# Patient Record
Sex: MALE | Race: BLACK OR AFRICAN AMERICAN | NOT HISPANIC OR LATINO | Employment: FULL TIME | URBAN - METROPOLITAN AREA
[De-identification: names, ages, dates, MRNs, and addresses within clinical notes are randomized per-mention and may not be internally consistent; named-entity substitution may affect disease eponyms.]

---

## 2020-04-06 ENCOUNTER — TELEMEDICINE (OUTPATIENT)
Dept: INTERNAL MEDICINE | Facility: CLINIC | Age: 35
End: 2020-04-06

## 2020-04-06 DIAGNOSIS — G43.909 MIGRAINE WITHOUT STATUS MIGRAINOSUS, NOT INTRACTABLE, UNSPECIFIED MIGRAINE TYPE: Primary | ICD-10-CM

## 2020-04-06 DIAGNOSIS — L20.9 ATOPIC DERMATITIS, UNSPECIFIED TYPE: ICD-10-CM

## 2020-04-06 PROCEDURE — 99204 OFFICE O/P NEW MOD 45 MIN: CPT | Performed by: NURSE PRACTITIONER

## 2020-04-06 RX ORDER — PREDNISONE 1 MG/1
TABLET ORAL
Qty: 21 TABLET | Refills: 1 | Status: SHIPPED | OUTPATIENT
Start: 2020-04-06 | End: 2023-03-28

## 2020-04-06 RX ORDER — SUMATRIPTAN 20 MG/1
1 SPRAY NASAL
Qty: 1 EACH | Refills: 5 | Status: SHIPPED | OUTPATIENT
Start: 2020-04-06 | End: 2023-03-28

## 2020-04-06 NOTE — PROGRESS NOTES
Subjective   Chief Complaint   Patient presents with   • Migraine     This is video visit.     Milton Buenrostro is a 34 y.o. male here today to establish care for headaches and rash  He has long history of migraine-like headaches that were diagnosed as cluster headaches.  Headache develops on the right side of his head and he has pain behind his right eye.  Headaches have been occurring several time weekly and sometimes every day. He has photophobia and nausea. No vomiting. He was previously treated with imitrex nasal spray and prednisone PRN. He's only had to take prednisone a couple of times in the past and this stopped the cycle of headaches. Does not feel his headaches are caused by hypertension. Needs blood work in the future. No labs in years. Has rash that develops seasonally in the spring with dry patches and small little itchy bumps. Rash occurs on all extremities. Has area on right lower extremity now.    Review of Systems   Constitutional: Negative for activity change, appetite change, chills, diaphoresis, fatigue, fever, unexpected weight gain and unexpected weight loss.   HENT: Negative for ear discharge, ear pain, mouth sores, nosebleeds, sinus pressure, sneezing and sore throat.    Eyes: Positive for photophobia and pain. Negative for discharge and itching.   Respiratory: Negative for cough, chest tightness, shortness of breath and wheezing.    Cardiovascular: Negative for chest pain, palpitations and leg swelling.   Gastrointestinal: Negative for abdominal pain, constipation, diarrhea, nausea and vomiting.   Endocrine: Negative for heat intolerance, polydipsia and polyphagia.   Genitourinary: Negative for dysuria, flank pain, frequency, hematuria and urgency.   Musculoskeletal: Negative for arthralgias, back pain, gait problem, joint swelling, myalgias, neck pain and neck stiffness.   Skin: Positive for dry skin and rash. Negative for color change and pallor.   Allergic/Immunologic: Negative for  immunocompromised state.   Neurological: Positive for headache. Negative for seizures, speech difficulty, weakness and numbness.   Hematological: Negative for adenopathy.   Psychiatric/Behavioral: Negative for agitation, decreased concentration, sleep disturbance, suicidal ideas and depressed mood. The patient is not nervous/anxious.        History reviewed. No pertinent past medical history.  No past surgical history on file.  History reviewed. No pertinent family history.  Social History     Tobacco Use   Smoking Status Not on file      Social History     Substance and Sexual Activity   Alcohol Use Not on file      No current outpatient medications on file prior to visit.     No current facility-administered medications on file prior to visit.      No Known Allergies    Objective   There were no vitals filed for this visit.  There is no height or weight on file to calculate BMI.    Physical Exam   Constitutional: He is oriented to person, place, and time. He appears well-developed and well-nourished.   HENT:   Head: Normocephalic and atraumatic.   Nose: Nose normal.   Eyes: Conjunctivae and lids are normal.   Neck: Trachea normal.   Pulmonary/Chest: No respiratory distress.   Neurological: He is alert and oriented to person, place, and time. GCS eye subscore is 4. GCS verbal subscore is 5. GCS motor subscore is 6.   Skin: Rash noted.   Right lower extremity - dry patch with small bumps (visualized on video).   Psychiatric: He has a normal mood and affect. His speech is normal and behavior is normal. Thought content normal. Cognition and memory are normal.       Assessment/Plan   Milton was seen today for migraine.    Diagnoses and all orders for this visit:    Migraine without status migrainosus, not intractable, unspecified migraine type  Chronic condition unstable requiring medication management and monitoring.  Will eat well balanced low sodium diet, increase water intake including at least two electrolyte  balanced drinks such as Propel or Gatorade daily, increase physical activity, and get adequate rest. Will keep log of migraine frequency, severity, and symptoms for next appt.   Discuss with him in future if he's had MRI? Requested that he intermittently attempt to monitor blood pressure to rule out this as cause for headaches.   -     SUMAtriptan (Imitrex) 20 MG/ACT nasal spray; 1 spray into the nostril(s) as directed by provider Every 2 (Two) Hours As Needed for Migraine.  -     predniSONE (DELTASONE) 5 MG tablet; 6 day taper pack - take as directed.    Atopic dermatitis, unspecified type  Chronic issue unstable requiring medication management.  Will eat a well-balanced diet, increase water intake, and get adequate rest.  Discussed skin hygiene and importance of moisture. Suggested daily antihistamine.   -     predniSONE (DELTASONE) 5 MG tablet; 6 day taper pack - take as directed.  -     triamcinolone (KENALOG) 0.1 % ointment; Apply  topically to the appropriate area as directed 2 (Two) Times a Day.           Current Outpatient Medications:   •  predniSONE (DELTASONE) 5 MG tablet, 6 day taper pack - take as directed., Disp: 21 tablet, Rfl: 1  •  SUMAtriptan (Imitrex) 20 MG/ACT nasal spray, 1 spray into the nostril(s) as directed by provider Every 2 (Two) Hours As Needed for Migraine., Disp: 1 each, Rfl: 5  •  triamcinolone (KENALOG) 0.1 % ointment, Apply  topically to the appropriate area as directed 2 (Two) Times a Day., Disp: 80 g, Rfl: 2       Plan of care reviewed with the patient at the conclusion of today's visit.  Education was provided regarding diagnosis, management, and any prescribed or recommended OTC medications.  Patient verbalized understanding of and agreement with management plan.     Return if symptoms worsen or fail to improve.      Daisy Ramon, APRN    Please note that portions of this note were completed with a voice recognition program. Efforts were made to edit the dictations,  but occasionally words are mistranscribed.

## 2023-03-29 ENCOUNTER — OFFICE VISIT (OUTPATIENT)
Dept: SURGERY | Facility: CLINIC | Age: 38
End: 2023-03-29
Payer: COMMERCIAL

## 2023-03-29 VITALS
DIASTOLIC BLOOD PRESSURE: 90 MMHG | TEMPERATURE: 98.2 F | WEIGHT: 225 LBS | BODY MASS INDEX: 28.88 KG/M2 | HEART RATE: 132 BPM | OXYGEN SATURATION: 100 % | SYSTOLIC BLOOD PRESSURE: 146 MMHG | HEIGHT: 74 IN

## 2023-03-29 DIAGNOSIS — L72.3 SEBACEOUS CYST: Primary | ICD-10-CM

## 2023-03-29 DIAGNOSIS — L02.411 CUTANEOUS ABSCESS OF RIGHT AXILLA: ICD-10-CM

## 2023-03-29 PROCEDURE — 10061 I&D ABSCESS COMP/MULTIPLE: CPT | Performed by: SURGERY

## 2023-03-29 PROCEDURE — 99204 OFFICE O/P NEW MOD 45 MIN: CPT | Performed by: SURGERY

## 2023-03-29 NOTE — PROGRESS NOTES
Patient: Milton Buenrostro    YOB: 1985    Date: 03/29/2023    Primary Care Provider: Daisy Moreno APRN    Chief Complaint   Patient presents with   • Mass     Axillary        SUBJECTIVE:    History of present illness:  Patient is here for evaluation of a mass in his left axilla.  Patient was seen at Urgent Care 03/28/2023, he was given Rx Bactrim DS which he has been taking as directed. The patient states the mass has been present for the past 20 years. He states the mass got inflamed a couple days ago.     This does cause him sharp discomfort, associated with erythema, located in the right axilla, present over the past several days, worse with pressure, not relieved.    The following portions of the patient's history were reviewed and updated as appropriate: allergies, current medications, past family history, past medical history, past social history, past surgical history and problem list.      Review of Systems   Constitutional: Negative for chills, fever and unexpected weight change.   HENT: Negative for trouble swallowing and voice change.    Eyes: Negative for visual disturbance.   Respiratory: Negative for apnea, cough, chest tightness, shortness of breath and wheezing.    Cardiovascular: Negative for chest pain, palpitations and leg swelling.   Gastrointestinal: Negative for abdominal distention, abdominal pain, anal bleeding, blood in stool, constipation, diarrhea, nausea, rectal pain and vomiting.   Endocrine: Negative for cold intolerance and heat intolerance.   Genitourinary: Negative for difficulty urinating, dysuria, flank pain, scrotal swelling and testicular pain.   Musculoskeletal: Negative for back pain, gait problem and joint swelling.   Skin: Negative for color change, rash and wound.   Neurological: Negative for dizziness, syncope, speech difficulty, weakness, numbness and headaches.   Hematological: Negative for adenopathy. Does not bruise/bleed easily.  "  Psychiatric/Behavioral: Negative for confusion. The patient is not nervous/anxious.        Allergies:  Allergies   Allergen Reactions   • Bismuth Subsalicylate Rash       Medications:    Current Outpatient Medications:   •  sulfamethoxazole-trimethoprim (BACTRIM DS,SEPTRA DS) 800-160 MG per tablet, Take 1 tablet by mouth 2 (Two) Times a Day for 10 days., Disp: 20 tablet, Rfl: 0    History:  Past Medical History:   Diagnosis Date   • Migraine-cluster headache syndrome        History reviewed. No pertinent surgical history.    History reviewed. No pertinent family history.    Social History     Tobacco Use   • Smoking status: Never   • Smokeless tobacco: Never   Vaping Use   • Vaping Use: Never used   Substance Use Topics   • Alcohol use: Defer   • Drug use: Defer        OBJECTIVE:    Vital Signs:   Vitals:    03/29/23 1514   BP: 146/90   Pulse: (!) 132   Temp: 98.2 °F (36.8 °C)   SpO2: 100%   Weight: 102 kg (225 lb)   Height: 188 cm (74\")       Physical Exam:   General Appearance:    Alert, cooperative, in no acute distress   Head:    Normocephalic, without obvious abnormality, atraumatic   Eyes:            Lids and lashes normal, conjunctivae and sclerae normal, no   icterus, no pallor, corneas clear, PERRLA   Ears:    Ears appear intact with no abnormalities noted   Throat:   No oral lesions, no thrush, oral mucosa moist   Neck:   No adenopathy, supple, trachea midline, no thyromegaly, no   carotid bruit, no JVD   Lungs:     Clear to auscultation,respirations regular, even and                  unlabored    Heart:    Regular rhythm and normal rate, normal S1 and S2, no            murmur, no gallop, no rub, no click   Chest Wall:    No abnormalities observed   Abdomen:     Normal bowel sounds, no masses, no organomegaly, soft        non-tender, non-distended, no guarding, no rebound                tenderness   Extremities:   Moves all extremities well, no edema, no cyanosis, no             redness   Pulses:   " Pulses palpable and equal bilaterally   Skin:   No bleeding, bruising or rash, abscess of the right axilla   Lymph nodes:   No palpable adenopathy   Neurologic:   Cranial nerves 2 - 12 grossly intact, sensation intact, DTR       present and equal bilaterally     Results Review:   I reviewed the patient's new clinical results.  I reviewed the patient's new imaging results and agree with the interpretation.  I reviewed the patient's other test results and agree with the interpretation    Review of Systems was reviewed and confirmed as accurate as documented by the MA.    ASSESSMENT/PLAN:    1. Sebaceous cyst    2. Cutaneous abscess of right axilla        Procedure:     I recommended abscess drainage to the patient. I explained the indication as well as the risks and benefits which include bleeding, further infection requiring additional procedures, non healing of the wound etc. The patient understands these and wishes to proceed.      The patient was brought to the procedure room. Consent and time out were performed. The area was prepped and draped in the usual fashion. 1% lidocaine with epinephrine was infused locally. The abscess was then incised and drained sharply with a #11 blade. Purulent contents were evacuated and irrigated with saline and peroxide. Minimal blood loss had occurred and was well controlled with pressure. There were no complications and the patient tolerated the procedure well. Wound instructions were given.    Clinically this was an infected sebaceous cyst and a large amount of sebum and cyst cavity was removed.  I have asked the patient to stay on his current antibiotics.  This was complicated in nature.    I discussed the patients findings and my recommendations with patient        Electronically signed by Garrick Bustamante MD  03/29/23

## 2025-01-24 ENCOUNTER — PATIENT ROUNDING (BHMG ONLY) (OUTPATIENT)
Dept: URGENT CARE | Facility: CLINIC | Age: 40
End: 2025-01-24
Payer: COMMERCIAL

## 2025-02-03 ENCOUNTER — OFFICE VISIT (OUTPATIENT)
Age: 40
End: 2025-02-03
Payer: COMMERCIAL

## 2025-02-03 ENCOUNTER — LAB (OUTPATIENT)
Age: 40
End: 2025-02-03
Payer: COMMERCIAL

## 2025-02-03 VITALS
BODY MASS INDEX: 30.39 KG/M2 | HEIGHT: 74 IN | SYSTOLIC BLOOD PRESSURE: 154 MMHG | HEART RATE: 70 BPM | OXYGEN SATURATION: 99 % | WEIGHT: 236.8 LBS | DIASTOLIC BLOOD PRESSURE: 94 MMHG

## 2025-02-03 DIAGNOSIS — Z12.5 SCREENING FOR PROSTATE CANCER: ICD-10-CM

## 2025-02-03 DIAGNOSIS — I10 PRIMARY HYPERTENSION: ICD-10-CM

## 2025-02-03 DIAGNOSIS — E66.9 OBESITY (BMI 30-39.9): ICD-10-CM

## 2025-02-03 DIAGNOSIS — I10 PRIMARY HYPERTENSION: Primary | ICD-10-CM

## 2025-02-03 LAB — HBA1C MFR BLD: 5.8 % (ref 4.8–5.6)

## 2025-02-03 PROCEDURE — 99214 OFFICE O/P EST MOD 30 MIN: CPT

## 2025-02-03 PROCEDURE — G0103 PSA SCREENING: HCPCS

## 2025-02-03 PROCEDURE — 80053 COMPREHEN METABOLIC PANEL: CPT

## 2025-02-03 PROCEDURE — 93000 ELECTROCARDIOGRAM COMPLETE: CPT

## 2025-02-03 PROCEDURE — 83036 HEMOGLOBIN GLYCOSYLATED A1C: CPT

## 2025-02-03 PROCEDURE — 84443 ASSAY THYROID STIM HORMONE: CPT

## 2025-02-03 PROCEDURE — 80061 LIPID PANEL: CPT

## 2025-02-03 PROCEDURE — 82306 VITAMIN D 25 HYDROXY: CPT

## 2025-02-03 RX ORDER — LOSARTAN POTASSIUM 25 MG/1
25 TABLET ORAL DAILY
Qty: 30 TABLET | Refills: 3 | Status: SHIPPED | OUTPATIENT
Start: 2025-02-03

## 2025-02-03 NOTE — PROGRESS NOTES
New Patient Office Visit      Date: 2025   Patient Name: Milton Buenrostro  : 1985   MRN: [id number]     Chief Complaint:    Chief Complaint   Patient presents with    New Patient    Headache       History of Present Illness: Milton Buenrostro is a 39 y.o. male who is here today to establish care.    History of Present Illness  The patient is a 39-year-old male who presents for evaluation of cluster headaches, elevated blood pressure, and erectile dysfunction.    He has been experiencing cluster headaches since the age of 28, which he believes are seasonally triggered, typically occurring at the end of January or early February. These episodes can last up to 3 weeks. He experienced his first headache of the season yesterday. The pain is described as a burning sensation behind one eye, accompanied by nasal drainage and redness in the affected eye. Without medication, these symptoms can persist for 3 to 4 hours, but with treatment, they usually subside within 30 minutes. However, if the pain intensifies before medication is taken, the treatment becomes ineffective. Oxygen therapy administered in the emergency room has proven beneficial, alleviating symptoms within 20 minutes. He has identified certain triggers for his headaches, including alcohol consumption, napping, sexual activity, excessive heat, hyperactivity, and smoking. He has not identified any other triggers apart from weather changes. He has not consulted a neurologist for his condition. Initial treatments with prednisone and sumatriptan were ineffective, but a 2-week tapering course of prednisone has been successful in managing his symptoms. He occasionally requires a second prescription, which he does not always complete. He has been using this treatment regimen for the past 5 to 6 years. He ran out of his steroid medication yesterday.    He has been monitoring his blood pressure at home and has been making lifestyle modifications, including  regular gym workouts, to manage his hypertension. He does not own a blood pressure cuff. He has expressed concerns about potential side effects of antihypertensive medications, particularly drowsiness, given his occupation as a .    He has reported some issues with erectile function, although he is still able to engage in sexual activity. He has expressed interest in having his testosterone levels checked.    Supplemental Information  He had a ringworm infection, which was successfully treated with a prescribed cream.    SOCIAL HISTORY  He does not smoke. He drinks about 2 drinks a week. He reports no history of drug use.    FAMILY HISTORY  His mother and maternal grandmother had diabetes. His father has prostate cancer. He does not recall the medical history of his paternal grandparents. There is no family history of migraines or headaches.    ALLERGIES  He had a rash from BISMUTH SUBSALICYLATE (PEPTO-BISMOL) in the past.    MEDICATIONS  Current: Sumatriptan, prednisone  Past: Fioricet      Subjective      Review of Systems:   Review of Systems    Past Medical History:   Past Medical History:   Diagnosis Date    Migraine-cluster headache syndrome        Past Surgical History: History reviewed. No pertinent surgical history.    Family History:   Family History   Problem Relation Age of Onset    Diabetes Mother     Prostate cancer Father     Diabetes Maternal Grandmother     Colon cancer Neg Hx        Social History:   Social History     Socioeconomic History    Marital status: Single   Tobacco Use    Smoking status: Never    Smokeless tobacco: Never   Vaping Use    Vaping status: Never Used   Substance and Sexual Activity    Alcohol use: Not Currently     Alcohol/week: 2.0 standard drinks of alcohol     Types: 1 Glasses of wine, 1 Cans of beer per week    Drug use: Never    Sexual activity: Yes     Partners: Female       Medications:     Current Outpatient Medications:     SUMAtriptan (IMITREX) 20 MG/ACT nasal  "spray, Administer 1 spray into the nostril(s) as directed by provider Every 2 (Two) Hours As Needed for Migraine (max 1 dose (20mg) per 24 hours.)., Disp: 2 each, Rfl: 0    losartan (Cozaar) 25 MG tablet, Take 1 tablet by mouth Daily., Disp: 30 tablet, Rfl: 3    Allergies:   Allergies   Allergen Reactions    Bismuth Subsalicylate Rash       Immunizations:  Immunization History   Administered Date(s) Administered    DTP [birth year], 01/14/1985, 02/13/1986    Hep B, Adolescent or Pediatric 02/19/1998, 03/18/1998    MMR 09/26/1985, 08/15/1995    OPV 1985, 1985, 02/13/1986       Objective     Physical Exam:  Vital Signs:   Vitals:    02/03/25 1515   BP: 154/94   BP Location: Left arm   Patient Position: Sitting   Cuff Size: Large Adult   Pulse: 70   SpO2: 99%   Weight: 107 kg (236 lb 12.8 oz)   Height: 188 cm (74.02\")   PainSc: 0-No pain     Body mass index is 30.39 kg/m².    Physical Exam  Constitutional:       Appearance: Normal appearance.   HENT:      Head: Normocephalic and atraumatic.      Nose: No congestion or rhinorrhea.      Mouth/Throat:      Pharynx: No oropharyngeal exudate or posterior oropharyngeal erythema.   Eyes:      General:         Right eye: No discharge.         Left eye: No discharge.      Extraocular Movements: Extraocular movements intact.      Pupils: Pupils are equal, round, and reactive to light.   Cardiovascular:      Rate and Rhythm: Normal rate and regular rhythm.      Heart sounds: No murmur heard.     No friction rub. No gallop.   Pulmonary:      Effort: Pulmonary effort is normal.      Breath sounds: Normal breath sounds.   Abdominal:      General: Abdomen is flat. Bowel sounds are normal. There is no distension.      Palpations: Abdomen is soft.      Tenderness: There is no abdominal tenderness. There is no guarding or rebound.   Musculoskeletal:         General: Normal range of motion.      Cervical back: Normal range of motion.      Right lower leg: No edema. " "     Left lower leg: No edema.   Skin:     General: Skin is warm.      Capillary Refill: Capillary refill takes less than 2 seconds.      Findings: No rash.   Neurological:      General: No focal deficit present.      Mental Status: He is alert.   Psychiatric:         Mood and Affect: Mood normal.           ECG 12 Lead    Date/Time: 2/3/2025 4:05 PM  Performed by: Ivette Shah MD    Authorized by: Ivette Shah MD  Comparison: not compared with previous ECG   Rhythm: sinus rhythm  Rate: normal  Conduction: conduction normal  ST Segments: ST segments normal  T Waves: T waves normal  QRS axis: normal  Other: no other findings    Clinical impression: normal ECG          Results:     Labs:   No results found for: \"HGBA1C\", \"CMP\", \"CBCDIFFPANEL\", \"CREAT\", \"TSH\"     Imaging:   No valid procedures specified.     Assessment / Plan      Assessment/Plan:     Diagnoses and all orders for this visit:    1. Primary hypertension (Primary)  -     Hemoglobin A1c; Future  -     Comprehensive Metabolic Panel; Future  -     Vitamin D,25-Hydroxy; Future  -     TSH Rfx On Abnormal To Free T4; Future  -     Microalbumin / Creatinine Urine Ratio - Urine, Clean Catch; Future  -     Ambulatory Referral to Nutrition Services  -     Lipid panel; Future  -     ECG 12 Lead  -     losartan (Cozaar) 25 MG tablet; Take 1 tablet by mouth Daily.  Dispense: 30 tablet; Refill: 3    2. Screening for prostate cancer  -     PSA Screen; Future    3. Obesity (BMI 30-39.9)  -     Ambulatory Referral to Nutrition Services         Assessment & Plan  1. Cluster headaches.  The patient's symptoms align with the typical presentation of cluster headaches, which are more prevalent in males and often associated with stress. His elevated blood pressure could potentially exacerbate his headaches and increase their frequency. A comprehensive discussion was held regarding the use of verapamil as a prophylactic treatment for his cluster headaches, " particularly during the period from January to February when he anticipates the onset of these episodes. The risks associated with prolonged steroid use were also discussed. He was advised to maintain a consistent schedule for monitoring his blood pressure at home.    2 Primary hypertension  His blood pressure reading today was 154/94, which is concerning given his family history of diabetes and prostate cancer. He was informed about the potential health risks associated with uncontrolled hypertension, including heart attack, stroke, vision loss, and kidney damage. He was advised to make lifestyle modifications to manage his blood pressure. Will start patient on losartan 25mg daily.     3. Health maintenance.  A cholesterol panel will be ordered today. He was offered the Tdap vaccine, which he declined.    Follow-up  The patient will follow up in 1 to 2 weeks.          Follow Up:   Return in about 4 weeks (around 3/3/2025).    Patient or patient representative verbalized consent for the use of Ambient Listening during the visit with  Ivette Shah MD for chart documentation. 2/3/2025  16:05 ALFONZO Shah

## 2025-02-04 LAB
25(OH)D3 SERPL-MCNC: 17.2 NG/ML (ref 30–100)
ALBUMIN SERPL-MCNC: 4 G/DL (ref 3.5–5.2)
ALBUMIN/GLOB SERPL: 1.3 G/DL
ALP SERPL-CCNC: 69 U/L (ref 39–117)
ALT SERPL W P-5'-P-CCNC: 18 U/L (ref 1–41)
ANION GAP SERPL CALCULATED.3IONS-SCNC: 12 MMOL/L (ref 5–15)
AST SERPL-CCNC: 22 U/L (ref 1–40)
BILIRUB SERPL-MCNC: 0.2 MG/DL (ref 0–1.2)
BUN SERPL-MCNC: 12 MG/DL (ref 6–20)
BUN/CREAT SERPL: 8.2 (ref 7–25)
CALCIUM SPEC-SCNC: 9.8 MG/DL (ref 8.6–10.5)
CHLORIDE SERPL-SCNC: 103 MMOL/L (ref 98–107)
CHOLEST SERPL-MCNC: 140 MG/DL (ref 0–200)
CO2 SERPL-SCNC: 28 MMOL/L (ref 22–29)
CREAT SERPL-MCNC: 1.46 MG/DL (ref 0.76–1.27)
EGFRCR SERPLBLD CKD-EPI 2021: 62 ML/MIN/1.73
GLOBULIN UR ELPH-MCNC: 3.2 GM/DL
GLUCOSE SERPL-MCNC: 100 MG/DL (ref 65–99)
HDLC SERPL-MCNC: 65 MG/DL (ref 40–60)
LDLC SERPL CALC-MCNC: 61 MG/DL (ref 0–100)
LDLC/HDLC SERPL: 0.93 {RATIO}
POTASSIUM SERPL-SCNC: 4.2 MMOL/L (ref 3.5–5.2)
PROT SERPL-MCNC: 7.2 G/DL (ref 6–8.5)
PSA SERPL-MCNC: 1.67 NG/ML (ref 0–4)
SODIUM SERPL-SCNC: 143 MMOL/L (ref 136–145)
TRIGL SERPL-MCNC: 72 MG/DL (ref 0–150)
TSH SERPL DL<=0.05 MIU/L-ACNC: 1.88 UIU/ML (ref 0.27–4.2)
VLDLC SERPL-MCNC: 14 MG/DL (ref 5–40)

## 2025-02-13 DIAGNOSIS — G44.019 EPISODIC CLUSTER HEADACHE, NOT INTRACTABLE: ICD-10-CM

## 2025-02-13 NOTE — TELEPHONE ENCOUNTER
Caller: Porter Milton L    Relationship: Self    Best call back number: [phone]     Requested Prescriptions:   Requested Prescriptions     Pending Prescriptions Disp Refills    SUMAtriptan (IMITREX) 20 MG/ACT nasal spray 2 each 0     Sig: Administer 1 spray into the nostril(s) as directed by provider Every 2 (Two) Hours As Needed for Migraine (max 1 dose (20mg) per 24 hours.).    predniSONE (DELTASONE) 20 MG tablet 30 tablet 0     Sig: Take 4 tablets by mouth Daily for 3 days, THEN 3 tablets Daily for 3 days, THEN 2 tablets Daily for 3 days, THEN 1 tablet Daily for 3 days.        Pharmacy where request should be sent: [id number] - [place], [place] - [address] - [phone]  - [phone] FX     Last office visit with prescribing clinician: 2/3/2025   Last telemedicine visit with prescribing clinician: Visit date not found   Next office visit with prescribing clinician: 3/3/2025     Additional details provided by patient: PATIENT IS COMPLETELY OUT OF THE PREDNISONE BUT STATES THAT HE IS HAVING CLUSTER HEADACHES      Does the patient have less than a 3 day supply:  [x] Yes  [] No    Would you like a call back once the refill request has been completed: [] Yes [x] No    If the office needs to give you a call back, can they leave a voicemail: [] Yes [x] No    Aristeo Power Rep   02/13/25 12:15 EST

## 2025-02-14 ENCOUNTER — HOSPITAL ENCOUNTER (EMERGENCY)
Facility: HOSPITAL | Age: 40
Discharge: HOME | End: 2025-02-14
Attending: EMERGENCY MEDICINE
Payer: COMMERCIAL

## 2025-02-14 VITALS
WEIGHT: 230 LBS | TEMPERATURE: 97.8 F | HEIGHT: 74 IN | SYSTOLIC BLOOD PRESSURE: 139 MMHG | BODY MASS INDEX: 29.52 KG/M2 | RESPIRATION RATE: 16 BRPM | HEART RATE: 80 BPM | DIASTOLIC BLOOD PRESSURE: 82 MMHG | OXYGEN SATURATION: 100 %

## 2025-02-14 DIAGNOSIS — G44.011 INTRACTABLE EPISODIC CLUSTER HEADACHE: Primary | ICD-10-CM

## 2025-02-14 PROCEDURE — 99283 EMERGENCY DEPT VISIT LOW MDM: CPT

## 2025-02-14 PROCEDURE — 63710000001 PREDNISONE PER 1 MG: Performed by: EMERGENCY MEDICINE

## 2025-02-14 RX ORDER — SUMATRIPTAN 20 MG/1
1 SPRAY NASAL
Qty: 2 EACH | Refills: 0 | Status: SHIPPED | OUTPATIENT
Start: 2025-02-14

## 2025-02-14 RX ORDER — PREDNISONE 20 MG/1
60 TABLET ORAL ONCE
Status: COMPLETED | OUTPATIENT
Start: 2025-02-14 | End: 2025-02-14

## 2025-02-14 RX ORDER — PREDNISONE 20 MG/1
TABLET ORAL
Qty: 30 TABLET | Refills: 0 | Status: SHIPPED | OUTPATIENT
Start: 2025-02-14 | End: 2025-02-25

## 2025-02-14 RX ORDER — SUMATRIPTAN 6 MG/.5ML
6 INJECTION, SOLUTION SUBCUTANEOUS ONCE
Status: DISCONTINUED | OUTPATIENT
Start: 2025-02-14 | End: 2025-02-14 | Stop reason: HOSPADM

## 2025-02-14 RX ADMIN — PREDNISONE 60 MG: 20 TABLET ORAL at 00:47

## 2025-02-14 NOTE — FSED PROVIDER NOTE
Subjective   History of Present Illness  Patient presents to the emergency department for a cluster headache.  Has a history of cluster headaches usually takes steroids and intranasal sumatriptan.  Last used at 8:00.  Headache and gone away but came back around midnight.  Came in for evaluation he was prescribed more intranasal sumatriptan and a prednisone taper from his doctor earlier today was unable to get it filled due to the pharmacy not being able to prepare it for him.  Came here for evaluation.  He denies any significant numbness weakness tingling or change from his previous cluster headache    History provided by:  Patient   used: No        Review of Systems   Neurological:  Positive for headaches.   All other systems reviewed and are negative.      Past Medical History:   Diagnosis Date    Migraine-cluster headache syndrome        Allergies   Allergen Reactions    Bismuth Subsalicylate Rash       No past surgical history on file.    Family History   Problem Relation Age of Onset    Diabetes Mother     Prostate cancer Father     Diabetes Maternal Grandmother     Colon cancer Neg Hx        Social History     Socioeconomic History    Marital status: Single   Tobacco Use    Smoking status: Never    Smokeless tobacco: Never   Vaping Use    Vaping status: Never Used   Substance and Sexual Activity    Alcohol use: Not Currently     Alcohol/week: 2.0 standard drinks of alcohol     Types: 1 Glasses of wine, 1 Cans of beer per week    Drug use: Never    Sexual activity: Yes     Partners: Female           Objective   Physical Exam  Vitals and nursing note reviewed.   Constitutional:       Appearance: Normal appearance.   HENT:      Head: Normocephalic and atraumatic.      Nose: Nose normal.      Mouth/Throat:      Mouth: Mucous membranes are moist.      Pharynx: Oropharynx is clear.   Eyes:      Extraocular Movements: Extraocular movements intact.      Pupils: Pupils are equal, round, and reactive  to light.   Cardiovascular:      Rate and Rhythm: Normal rate and regular rhythm.      Pulses: Normal pulses.      Heart sounds: Normal heart sounds.   Pulmonary:      Effort: Pulmonary effort is normal.   Abdominal:      General: Abdomen is flat. There is no distension.      Tenderness: There is no abdominal tenderness. There is no guarding or rebound.   Musculoskeletal:         General: No swelling, tenderness, deformity or signs of injury. Normal range of motion.      Cervical back: Normal range of motion and neck supple.   Skin:     General: Skin is warm and dry.   Neurological:      General: No focal deficit present.      Mental Status: He is alert and oriented to person, place, and time.      Cranial Nerves: No cranial nerve deficit.         Procedures           ED Course                                           Medical Decision Making  Hemodynamically stable and afebrile.  Patient is neurovascularly intact.  Patient was on percent nonrebreather as well as given a dose of steroids.  Triptans were ordered but he refused this.  After about half an hour patient was feeling back to normal says he feels comfortable going home.  Encouraged him to  his medications in the morning.  Discharge    Problems Addressed:  Intractable episodic cluster headache: complicated acute illness or injury    Amount and/or Complexity of Data Reviewed  External Data Reviewed: radiology and notes.    Risk  Prescription drug management.        Final diagnoses:   Intractable episodic cluster headache       ED Disposition  ED Disposition       ED Disposition   Discharge    Condition   Stable    Comment   --               Ivette Shah MD  [address]  [address]  [facility] [address]  [phone]    Schedule an appointment as soon as possible for a visit       [facility] EMERGENCY DEPARTMENT [place]  [address]  [facility] [id number] [phone]  Go to   As needed          Medication List      No changes were made to your prescriptions during this visit.

## 2025-02-19 ENCOUNTER — OFFICE VISIT (OUTPATIENT)
Age: 40
End: 2025-02-19
Payer: COMMERCIAL

## 2025-02-19 VITALS
SYSTOLIC BLOOD PRESSURE: 144 MMHG | WEIGHT: 224.19 LBS | OXYGEN SATURATION: 98 % | HEIGHT: 74 IN | DIASTOLIC BLOOD PRESSURE: 88 MMHG | BODY MASS INDEX: 28.77 KG/M2 | HEART RATE: 96 BPM

## 2025-02-19 DIAGNOSIS — I10 PRIMARY HYPERTENSION: ICD-10-CM

## 2025-02-19 DIAGNOSIS — G44.019 EPISODIC CLUSTER HEADACHE, NOT INTRACTABLE: Primary | ICD-10-CM

## 2025-02-19 DIAGNOSIS — R79.89 ELEVATED SERUM CREATININE: ICD-10-CM

## 2025-02-19 PROCEDURE — 99214 OFFICE O/P EST MOD 30 MIN: CPT

## 2025-02-19 RX ORDER — LOSARTAN POTASSIUM 50 MG/1
50 TABLET ORAL DAILY
Qty: 30 TABLET | Refills: 3 | Status: SHIPPED | OUTPATIENT
Start: 2025-02-19 | End: 2025-02-19

## 2025-02-19 RX ORDER — LOSARTAN POTASSIUM 25 MG/1
50 TABLET ORAL DAILY
Qty: 30 TABLET | Refills: 3 | Status: SHIPPED | OUTPATIENT
Start: 2025-02-19 | End: 2025-02-19

## 2025-02-19 RX ORDER — LOSARTAN POTASSIUM 50 MG/1
50 TABLET ORAL DAILY
Qty: 30 TABLET | Refills: 3 | Status: SHIPPED | OUTPATIENT
Start: 2025-02-19 | End: 2025-02-20

## 2025-02-19 NOTE — PROGRESS NOTES
"    Follow Up Office Visit      Date: 2025   Patient Name: Milton Buenrostro  : 1985   MRN: [id number]     Chief Complaint:    Chief Complaint   Patient presents with    Headache     \"Cluster \"  x 4 weeks today        History of Present Illness: Milton Buenrostro is a 39 y.o. male who is here today to follow up.    History of Present Illness  The patient presents for evaluation of cluster headaches, elevated blood pressure, and elevated creatinine.    He has been experiencing severe headaches, which he attributes to potential dietary triggers. He has been on a regimen of vitamin D, magnesium, and melatonin, taken nightly before bed. Despite not experiencing a headache on , he reported a mild sensation. However, after consuming a meal on Monday, he experienced an intense headache, which was unprecedented during his prednisone treatment. He managed to alleviate the pain within 1.5 hours using Imitrex injections and sprays. He has been cautious about his diet since then and has not experienced any further headaches. He is currently in the fourth week of his prednisone treatment, the longest duration he has been on this medication. He is concerned about the recurrence of headaches once the prednisone course is completed. He has been prescribed six doses of Imitrex, which he used twice last Thursday, necessitating an emergency room visit for a third dose. He suspects that a perfume may have triggered his headache. He is considering the use of oxygen therapy, as it has been effective in the past, and is reluctant to use lithium. He is also contemplating the use of verapamil and Emgality but is concerned about potential side effects. He has lost 10 pounds over the past two weeks due to reduced food intake, as he is apprehensive about potential food triggers. He is committed to making lifestyle changes and is hopeful that avoiding triggers will lead to remission. He has five more days of prednisone " "left and is curious about the next steps if his headaches persist after completing the course. He has been on a 12-day tapering dose of prednisone, starting with four tablets for three days, followed by three tablets for three days, and is currently on the third day of the two-tablet phase. He has six days of prednisone left.    He admits to forgetting his antihypertensive medication today. His blood pressure readings have been consistently in the 130s while on medication.    He has increased his water intake and reports no renal pain or urinary issues. He believes his elevated creatinine levels may be due to prolonged consumption of sugary drinks.    MEDICATIONS  Current: Imitrex, prednisone, vitamin D, magnesium, melatonin      Subjective      Review of Systems:   Review of Systems    I have reviewed the patients family history, social history, past medical history, past surgical history and have updated it as appropriate.     Medications:     Current Outpatient Medications:     losartan (Cozaar) 25 MG tablet, Take 1 tablet by mouth Daily., Disp: 30 tablet, Rfl: 3    predniSONE (DELTASONE) 20 MG tablet, Take 4 tablets by mouth Daily for 3 days, THEN 3 tablets Daily for 3 days, THEN 2 tablets Daily for 3 days, THEN 1 tablet Daily for 3 days., Disp: 30 tablet, Rfl: 0    SUMAtriptan (IMITREX) 20 MG/ACT nasal spray, Administer 1 spray into the nostril(s) as directed by provider Every 2 (Two) Hours As Needed for Migraine (max 1 dose (20mg) per 24 hours.)., Disp: 2 each, Rfl: 0    Allergies:   Allergies   Allergen Reactions    Bismuth Subsalicylate Rash       Objective     Physical Exam: Please see above  Vital Signs:   Vitals:    02/19/25 1554   BP: 144/88   BP Location: Left arm   Patient Position: Sitting   Cuff Size: Adult   Pulse: 96   SpO2: 98%   Weight: 102 kg (224 lb 3 oz)   Height: 188 cm (74.02\")     Body mass index is 28.77 kg/m².          Physical Exam  Constitutional:       Appearance: Normal appearance. "   HENT:      Head: Normocephalic and atraumatic.      Nose: No congestion or rhinorrhea.      Mouth/Throat:      Pharynx: No oropharyngeal exudate or posterior oropharyngeal erythema.   Eyes:      General:         Right eye: No discharge.         Left eye: No discharge.      Extraocular Movements: Extraocular movements intact.      Pupils: Pupils are equal, round, and reactive to light.   Cardiovascular:      Rate and Rhythm: Normal rate and regular rhythm.      Heart sounds: No murmur heard.     No friction rub. No gallop.   Pulmonary:      Effort: Pulmonary effort is normal.      Breath sounds: Normal breath sounds.   Abdominal:      General: Abdomen is flat. Bowel sounds are normal. There is no distension.      Palpations: Abdomen is soft.      Tenderness: There is no abdominal tenderness. There is no guarding or rebound.   Musculoskeletal:         General: Normal range of motion.      Cervical back: Normal range of motion.      Right lower leg: No edema.      Left lower leg: No edema.   Skin:     General: Skin is warm.      Capillary Refill: Capillary refill takes less than 2 seconds.      Findings: No rash.   Neurological:      General: No focal deficit present.      Mental Status: He is alert.   Psychiatric:         Mood and Affect: Mood normal.         Procedures    Results:   Results  Laboratory Studies  Creatinine was high.    Labs:   Hemoglobin A1C   Date Value Ref Range Status   02/03/2025 5.80 (H) 4.80 - 5.60 % Final     TSH   Date Value Ref Range Status   02/03/2025 1.880 0.270 - 4.200 uIU/mL Final        Imaging:   No valid procedures specified.     Assessment / Plan      Assessment/Plan:   Diagnoses and all orders for this visit:    1. Episodic cluster headache, not intractable (Primary)    2. Primary hypertension  -     Discontinue: losartan (Cozaar) 25 MG tablet; Take 2 tablets by mouth Daily.  Dispense: 30 tablet; Refill: 3  -     Discontinue: losartan (Cozaar) 50 MG tablet; Take 1 tablet by mouth  Daily.  Dispense: 30 tablet; Refill: 3  -     losartan (Cozaar) 50 MG tablet; Take 1 tablet by mouth Daily.  Dispense: 30 tablet; Refill: 3    3. Elevated serum creatinine  -     Comprehensive metabolic panel; Future         Assessment & Plan  1. Cluster headaches.  The patient has been experiencing cluster headaches for more than 4 weeks. He has been using Imitrex nasal spray, which provides relief within 1.5 hours. He is currently on a prednisone taper and has 5 days left of the medication. He is advised to continue with the prednisone regimen. If symptoms persist after completion of prednisone with start verapamil and prednisone. He is also advised to monitor his diet and avoid known triggers.    2. Hypertension   The patient's blood pressure remains elevated, with readings in the 130s while on medication. The dosage of his current antihypertensive medication will be increased to 50 mg. He is instructed to double his current dose if he has remaining medication at home. A new prescription has been sent to his pharmacy. He is advised to monitor his blood pressure regularly and maintain adequate hydration.    3. Elevated creatinine.  The patient's elevated creatinine levels could be attributed to dehydration, high blood pressure, or a pre-existing condition. Given that his filtration rate remains intact, it is unlikely that the cause is long-term damage. A repeat kidney function test will be ordered to monitor his creatinine levels. He is advised to maintain adequate hydration and continue his antihypertensive medication.    Follow-up  The patient will follow up in 2 weeks.    Follow Up:   No follow-ups on file.        Patient or patient representative verbalized consent for the use of Ambient Listening during the visit with  Ivette Shah MD for chart documentation. 2/19/2025  16:46 EST    Ivette Shah  [facility]

## 2025-02-20 RX ORDER — LOSARTAN POTASSIUM 50 MG/1
50 TABLET ORAL DAILY
Qty: 30 TABLET | Refills: 3 | Status: SHIPPED | OUTPATIENT
Start: 2025-02-20

## 2025-02-28 ENCOUNTER — OFFICE VISIT (OUTPATIENT)
Age: 40
End: 2025-02-28
Payer: COMMERCIAL

## 2025-02-28 VITALS
SYSTOLIC BLOOD PRESSURE: 128 MMHG | HEART RATE: 92 BPM | OXYGEN SATURATION: 99 % | BODY MASS INDEX: 28.49 KG/M2 | DIASTOLIC BLOOD PRESSURE: 88 MMHG | WEIGHT: 222 LBS | HEIGHT: 74 IN

## 2025-02-28 DIAGNOSIS — I10 PRIMARY HYPERTENSION: ICD-10-CM

## 2025-02-28 DIAGNOSIS — G44.019 EPISODIC CLUSTER HEADACHE, NOT INTRACTABLE: Primary | ICD-10-CM

## 2025-02-28 RX ORDER — SUMATRIPTAN 20 MG/1
1 SPRAY NASAL
Qty: 30 EACH | Refills: 0 | Status: SHIPPED | OUTPATIENT
Start: 2025-02-28

## 2025-02-28 NOTE — PROGRESS NOTES
Follow Up Office Visit      Date: 2025   Patient Name: Milton Buenrostro  : 1985   MRN: [id number]     Chief Complaint:    Chief Complaint   Patient presents with    Headache     Had one at 2am this morning       History of Present Illness: Milton Buenrostro is a 39 y.o. male who is here today to follow up with cluster headache.     History of Present Illness  The patient presents for evaluation of migraines and hypertension.    He experienced a migraine on Tuesday night, which he attributes to a potential food trigger. The severity of the migraine was not extreme, but it was significant enough to cause discomfort. He did not experience any migraines on Wednesday or Thursday. However, he was awakened by a migraine at 2:30 AM today, which has persisted throughout the day. He reports that his migraines tend to linger for a day, particularly disrupting his sleep. He is uncertain if his current migraine is food-related as he consumed cod, broccoli, and rice yesterday without any adverse effects. He has been using sumatriptan nasal spray for relief and has two doses remaining. His pharmacy, [facility], has informed him that he is not eligible for a refill until 2025. He expresses a desire to take a break from his current treatment regimen due to the mental strain caused by his migraines. He finds comfort in taking prednisone, as it allows him to go about his day without worrying about potential migraines. He is considering starting verapamil, but is hesitant due to the required 4-week commitment. He is relieved that his migraines are not chronic, but is concerned about their potential recurrence. He describes the pain as being located on the back of the right side of his head, with a burning sensation that has been absent in his recent episodes. He is also worried about identifying potential food triggers for his migraines, as he is unsure which foods may exacerbate his condition. He is currently  "avoiding high-salt foods and is seeking advice on how to manage his diet to prevent future migraines.    He has been monitoring his blood pressure at work, which has been fluctuating between the 120s and 130s. He recalls an instance where he forgot to take his medication and his blood pressure spiked to the 140s. He believes that his medication is effective in managing his blood pressure.    MEDICATIONS  Current: prednisone, sumatriptan      Subjective      Review of Systems:   Review of Systems    I have reviewed the patients family history, social history, past medical history, past surgical history and have updated it as appropriate.     Medications:     Current Outpatient Medications:     losartan (Cozaar) 50 MG tablet, Take 1 tablet by mouth Daily., Disp: 30 tablet, Rfl: 3    SUMAtriptan (IMITREX) 20 MG/ACT nasal spray, Administer 1 spray into the nostril(s) as directed by provider Every 2 (Two) Hours As Needed for Migraine (max 1 dose (20mg) per 24 hours.)., Disp: 2 each, Rfl: 0    Allergies:   Allergies   Allergen Reactions    Bismuth Subsalicylate Rash       Objective     Physical Exam: Please see above  Vital Signs:   Vitals:    02/28/25 1259   BP: 128/88   Pulse: 92   SpO2: 99%   Weight: 101 kg (222 lb)   Height: 188 cm (74.02\")     Body mass index is 28.49 kg/m².          Physical Exam  Constitutional:       Appearance: Normal appearance.   HENT:      Head: Normocephalic and atraumatic.      Nose: No congestion or rhinorrhea.      Mouth/Throat:      Pharynx: No oropharyngeal exudate or posterior oropharyngeal erythema.   Eyes:      General:         Right eye: No discharge.         Left eye: No discharge.      Extraocular Movements: Extraocular movements intact.      Pupils: Pupils are equal, round, and reactive to light.   Cardiovascular:      Rate and Rhythm: Normal rate and regular rhythm.      Heart sounds: No murmur heard.     No friction rub. No gallop.   Pulmonary:      Effort: Pulmonary effort is " normal.      Breath sounds: Normal breath sounds.   Abdominal:      General: Abdomen is flat. Bowel sounds are normal. There is no distension.      Palpations: Abdomen is soft.      Tenderness: There is no abdominal tenderness. There is no guarding or rebound.   Musculoskeletal:         General: Normal range of motion.      Cervical back: Normal range of motion.      Right lower leg: No edema.      Left lower leg: No edema.   Skin:     General: Skin is warm.      Capillary Refill: Capillary refill takes less than 2 seconds.      Findings: No rash.   Neurological:      General: No focal deficit present.      Mental Status: He is alert.   Psychiatric:         Mood and Affect: Mood normal.       Procedures    Results:   Results      Labs:   Hemoglobin A1C   Date Value Ref Range Status   02/03/2025 5.80 (H) 4.80 - 5.60 % Final     TSH   Date Value Ref Range Status   02/03/2025 1.880 0.270 - 4.200 uIU/mL Final        Imaging:   No valid procedures specified.     Assessment / Plan      Assessment/Plan:   Diagnoses and all orders for this visit:    1. Episodic cluster headache, not intractable (Primary)  -     Ambulatory Referral to Neurology    2. Primary hypertension         Assessment & Plan  1. Cluster headaches   His headache are likely exacerbated by stress, which could be a potential trigger. He has expressed a desire to postpone the initiation of verapamil treatment, but is worried about the side effects. A prescription for zolmitriptan nasal spray will be provided as an alternative to sumatriptan. If the pharmacy does not approve the refill, a call will be made to address the issue. A referral to neurology will be initiated for further evaluation and management of his migraines. He has been advised to monitor his blood pressure at home and report any significant changes. He has also been counseled on the risks associated with long-term prednisone use, including the potential for rebound headaches and increased  risk of diabetes. He has been encouraged to engage in activities that promote relaxation and reduce stress. If he experiences another severe episode, he is to inform us so that a prescription for prednisone can be sent without the need for an emergency department visit.    2. Hypertension.  His blood pressure readings have shown improvement today, with a reading of 128/88. However, it is not yet optimal. He reports that his blood pressure is better at home, although he has not been consistently monitoring it. He has been advised to check his blood pressure at home, particularly on Saturday and Sunday, and report the readings during his next visit. He has also been advised to avoid high-salt meals to manage his blood pressure effectively.    Follow-up  The patient will follow up on Monday.    Follow Up:   No follow-ups on file.        Patient or patient representative verbalized consent for the use of Ambient Listening during the visit with  Ivette Shah MD for chart documentation. 2/28/2025  14:54 EST    Ivette Shah  [facility] [place]

## 2025-03-02 ENCOUNTER — HOSPITAL ENCOUNTER (EMERGENCY)
Facility: HOSPITAL | Age: 40
Discharge: HOME | End: 2025-03-02
Attending: EMERGENCY MEDICINE | Admitting: EMERGENCY MEDICINE
Payer: COMMERCIAL

## 2025-03-02 VITALS
HEART RATE: 73 BPM | TEMPERATURE: 97.4 F | DIASTOLIC BLOOD PRESSURE: 101 MMHG | HEIGHT: 72 IN | WEIGHT: 221 LBS | OXYGEN SATURATION: 100 % | SYSTOLIC BLOOD PRESSURE: 150 MMHG | RESPIRATION RATE: 22 BRPM | BODY MASS INDEX: 29.93 KG/M2

## 2025-03-02 DIAGNOSIS — G44.019 EPISODIC CLUSTER HEADACHE, NOT INTRACTABLE: Primary | ICD-10-CM

## 2025-03-02 PROCEDURE — 99283 EMERGENCY DEPT VISIT LOW MDM: CPT

## 2025-03-02 RX ORDER — BUTALBITAL, ACETAMINOPHEN AND CAFFEINE 50; 325; 40 MG/1; MG/1; MG/1
2 TABLET ORAL ONCE
Status: COMPLETED | OUTPATIENT
Start: 2025-03-02 | End: 2025-03-02

## 2025-03-02 RX ADMIN — BUTALBITAL, ACETAMINOPHEN, AND CAFFEINE 2 TABLET: 325; 50; 40 TABLET ORAL at 06:32

## 2025-03-02 NOTE — FSED PROVIDER NOTE
"Subjective  History of Present Illness:    Patient presents to the emergency department with a cluster headache.  He states that he experiences these headaches quite often and oxygen typically takes care of this.  He states that previously he was on sumatriptan and prednisone for his headaches however this is been less effective recently.  Denies any recent head trauma.  Denies any fever, chills, cough, congestion, nausea or vomiting.  Patient mostly describes right-sided predominance.  States that is a typical headache for him      Nurses Notes reviewed and agree, including vitals, allergies, social history and prior medical history.     REVIEW OF SYSTEMS: All systems reviewed and not pertinent unless noted.  Review of Systems   Neurological:  Positive for headaches.   All other systems reviewed and are negative.      Past Medical History:   Diagnosis Date    Migraine-cluster headache syndrome        Allergies:    Bismuth subsalicylate      History reviewed. No pertinent surgical history.      Social History     Socioeconomic History    Marital status: Single   Tobacco Use    Smoking status: Never    Smokeless tobacco: Never   Vaping Use    Vaping status: Never Used   Substance and Sexual Activity    Alcohol use: Not Currently     Alcohol/week: 2.0 standard drinks of alcohol     Types: 1 Glasses of wine, 1 Cans of beer per week    Drug use: Never    Sexual activity: Yes     Partners: Female         Family History   Problem Relation Age of Onset    Diabetes Mother     Prostate cancer Father     Diabetes Maternal Grandmother     Colon cancer Neg Hx        Objective  Physical Exam:  BP (!) 150/101 (BP Location: Left arm, Patient Position: Sitting)   Pulse 73   Temp 97.4 °F (36.3 °C) (Oral)   Resp 22   Ht 182.9 cm (72\")   Wt 100 kg (221 lb)   SpO2 100%   BMI 29.97 kg/m²      Physical Exam  Vitals and nursing note reviewed.   Constitutional:       Appearance: Normal appearance. He is normal weight.   HENT:      " Head: Normocephalic and atraumatic.      Nose: Nose normal.      Mouth/Throat:      Mouth: Mucous membranes are moist.   Eyes:      Extraocular Movements: Extraocular movements intact.      Conjunctiva/sclera: Conjunctivae normal.      Pupils: Pupils are equal, round, and reactive to light.   Cardiovascular:      Rate and Rhythm: Normal rate and regular rhythm.      Pulses: Normal pulses.      Heart sounds: Normal heart sounds.   Pulmonary:      Effort: Pulmonary effort is normal.      Breath sounds: Normal breath sounds. No wheezing or rales.   Abdominal:      General: Abdomen is flat. Bowel sounds are normal.      Palpations: Abdomen is soft.   Musculoskeletal:         General: Normal range of motion.   Skin:     General: Skin is warm.      Capillary Refill: Capillary refill takes less than 2 seconds.   Neurological:      General: No focal deficit present.      Mental Status: He is alert and oriented to person, place, and time.   Psychiatric:         Mood and Affect: Mood normal.         Behavior: Behavior normal.         Thought Content: Thought content normal.         Judgment: Judgment normal.         Procedures    ED Course:         Lab Results (last 24 hours)       ** No results found for the last 24 hours. **             No radiology results from the last 24 hrs       MDM  Number of Diagnoses or Management Options  Episodic cluster headache, not intractable  Diagnosis management comments: Patient was evaluated and I placed the patient on 4 L of oxygen via nasal cannula while in the room.  He was given 2 Fioricet tablets.        Medications   butalbital-acetaminophen-caffeine (FIORICET, ESGIC) -40 MG per tablet 2 tablet (2 tablets Oral Given 3/2/25 0632)       Data interpreted: Nursing notes reviewed, vital signs reviewed.  Labs independently interpreted by me (CBC, CMP, lipase, UA, troponin, ABG, lactic acid, procalcitonin).  Imaging independently interpreted by me (x-ray, CT scan).  EKG independently  interpreted by me.  O2 saturation:    Counseling: Discussed the results above with the patient regarding need for admission or discharge.  Patient understands and agrees plan of care.      -----  ED Disposition       ED Disposition   Discharge    Condition   Stable    Comment   --             Final diagnoses:   Episodic cluster headache, not intractable      Your Follow-Up Providers       Ivette Shah MD. Call in 2 days.    Specialty: Family Medicine  [address]  [address] [address] [phone]                       Contact information for after-discharge care    Follow-up information has not been specified.                    Your medication list        CONTINUE taking these medications        Instructions Last Dose Given Next Dose Due   losartan 50 MG tablet  Commonly known as: Cozaar      Take 1 tablet by mouth Daily.       SUMAtriptan 20 MG/ACT nasal spray  Commonly known as: IMITREX      Administer 1 spray into the nostril(s) as directed by provider Every 2 (Two) Hours As Needed for Migraine (max 1 dose (20mg) per 24 hours.).

## 2025-03-03 ENCOUNTER — OFFICE VISIT (OUTPATIENT)
Age: 40
End: 2025-03-03
Payer: COMMERCIAL

## 2025-03-03 ENCOUNTER — HOSPITAL ENCOUNTER (OUTPATIENT)
Dept: NUTRITION | Facility: HOSPITAL | Age: 40
Setting detail: RECURRING SERIES
Discharge: HOME | End: 2025-03-03

## 2025-03-03 VITALS
DIASTOLIC BLOOD PRESSURE: 74 MMHG | WEIGHT: 221.4 LBS | TEMPERATURE: 98.2 F | HEIGHT: 74 IN | BODY MASS INDEX: 28.42 KG/M2 | OXYGEN SATURATION: 99 % | HEART RATE: 81 BPM | RESPIRATION RATE: 16 BRPM | SYSTOLIC BLOOD PRESSURE: 128 MMHG

## 2025-03-03 DIAGNOSIS — I10 PRIMARY HYPERTENSION: ICD-10-CM

## 2025-03-03 DIAGNOSIS — G44.019 EPISODIC CLUSTER HEADACHE, NOT INTRACTABLE: Primary | ICD-10-CM

## 2025-03-03 PROCEDURE — 97802 MEDICAL NUTRITION INDIV IN: CPT

## 2025-03-03 PROCEDURE — 99214 OFFICE O/P EST MOD 30 MIN: CPT

## 2025-03-03 RX ORDER — VERAPAMIL HYDROCHLORIDE 40 MG/1
40 TABLET ORAL 3 TIMES DAILY
Qty: 90 TABLET | Refills: 0 | Status: SHIPPED | OUTPATIENT
Start: 2025-03-03 | End: 2025-04-02

## 2025-03-03 RX ORDER — PREDNISONE 20 MG/1
TABLET ORAL
Qty: 32 TABLET | Refills: 0 | Status: SHIPPED | OUTPATIENT
Start: 2025-03-03

## 2025-03-03 NOTE — PROGRESS NOTES
Follow Up Office Visit      Date: 2025   Patient Name: Milton Buenrostro  : 1985   MRN: [id number]     Chief Complaint:    Chief Complaint   Patient presents with    Headache     Having some cluster headaches.   Seen in ER yesterday.        History of Present Illness: Milton Buenrostro is a 39 y.o. male who is here today to follow up with cluster headaches.     History of Present Illness  The patient presents for evaluation of cluster headaches.    He experienced a severe headache last night, which was not preceded by any symptoms during the day. He had consumed wings and fries without salt at a cocktail party with friends, but did not experience any immediate adverse effects. The following day, he prepared a pizza, which triggered a headache within 20 minutes. He took Imitrex and rested for approximately 2 hours, after which the headache subsided. However, upon waking up the next morning, he experienced a severe headache that was unresponsive to Imitrex. The pain was so intense that it did not even dull the headache. He reports that his headaches are consistent in their presentation, occurring on the same side with associated tearing. He has not undergone any brain imaging in the emergency department. His headaches typically occur at night, disrupting his sleep, a pattern that started over the weekend. Previously, his headaches would occur between 9:00 PM and 10:00 PM, but they have now shifted to around 2:58 AM. He reports that his headaches are already fully developed by the time they occur, rendering his medication ineffective. He is unable to obtain triptans until the . He finds steroids to be highly effective, but is concerned about potential long-term side effects. He has not yet received a call from neurology. He experiences severe pain behind his ear during his headaches. He prefers to endure the pain rather than take medication, but finds it unbearable when the pain intensifies.  "He has noticed that his headaches are less severe when he takes antihistamines at night. He has never experienced headaches beyond March. He reports no chest pain, shortness of breath, vision changes, numbness, tingling, weakness, or balance changes. He has lost approximately 15 pounds and is aiming to reduce his weight to 215 pounds before starting weightlifting to increase his muscle mass. He has been avoiding deep-fried foods and fatty substances. He has been taking prednisone 40 mg three times a day, which he finds beneficial.    SOCIAL HISTORY  He works for [employer].    MEDICATIONS  Current: Imitrex, prednisone, losartan      Subjective      Review of Systems:   Review of Systems    I have reviewed the patients family history, social history, past medical history, past surgical history and have updated it as appropriate.     Medications:     Current Outpatient Medications:     losartan (Cozaar) 50 MG tablet, Take 1 tablet by mouth Daily., Disp: 30 tablet, Rfl: 3    SUMAtriptan (IMITREX) 20 MG/ACT nasal spray, Administer 1 spray into the nostril(s) as directed by provider Every 2 (Two) Hours As Needed for Migraine (max 1 dose (20mg) per 24 hours.)., Disp: 30 each, Rfl: 0    Allergies:   Allergies   Allergen Reactions    Bismuth Subsalicylate Rash       Objective     Physical Exam: Please see above  Vital Signs:   Vitals:    03/03/25 0813   BP: 128/74   BP Location: Left arm   Patient Position: Sitting   Cuff Size: Adult   Pulse: 81   Resp: 16   Temp: 98.2 °F (36.8 °C)   TempSrc: Temporal   SpO2: 99%   Weight: 100 kg (221 lb 6.4 oz)   Height: 188 cm (74\")     Body mass index is 28.43 kg/m².          Physical Exam  Constitutional:       Appearance: Normal appearance.   HENT:      Head: Normocephalic and atraumatic.      Nose: No congestion or rhinorrhea.      Mouth/Throat:      Pharynx: No oropharyngeal exudate or posterior oropharyngeal erythema.   Eyes:      General:         Right eye: No " discharge.         Left eye: No discharge.      Extraocular Movements: Extraocular movements intact.      Pupils: Pupils are equal, round, and reactive to light.   Cardiovascular:      Rate and Rhythm: Normal rate and regular rhythm.      Heart sounds: No murmur heard.     No friction rub. No gallop.   Pulmonary:      Effort: Pulmonary effort is normal.      Breath sounds: Normal breath sounds.   Abdominal:      General: Abdomen is flat. Bowel sounds are normal. There is no distension.      Palpations: Abdomen is soft.      Tenderness: There is no abdominal tenderness. There is no guarding or rebound.   Musculoskeletal:         General: Normal range of motion.      Cervical back: Normal range of motion.      Right lower leg: No edema.      Left lower leg: No edema.   Skin:     General: Skin is warm.      Capillary Refill: Capillary refill takes less than 2 seconds.      Findings: No rash.   Neurological:      General: No focal deficit present.      Mental Status: He is alert.   Psychiatric:         Mood and Affect: Mood normal.         Procedures    Results:   Results  Testing  EKG showed normal sinus rhythm.    Labs:   Hemoglobin A1C   Date Value Ref Range Status   02/03/2025 5.80 (H) 4.80 - 5.60 % Final     TSH   Date Value Ref Range Status   02/03/2025 1.880 0.270 - 4.200 uIU/mL Final        Imaging:   No valid procedures specified.     Assessment / Plan      Assessment/Plan:   Diagnoses and all orders for this visit:    1. Episodic cluster headache, not intractable (Primary)  -     MRI Brain With & Without Contrast; Future    Other orders  -     predniSONE (DELTASONE) 20 MG tablet; 3 tabs PO daily x7 days, 2 tabs PO daily x3 days, 1 tab PO daily x3 days, 0.5 tab PO. daily x3 days, then STOP  Dispense: 32 tablet; Refill: 0  -     verapamil (CALAN) 40 MG tablet; Take 1 tablet by mouth 3 (Three) Times a Day for 30 days.  Dispense: 90 tablet; Refill: 0              Assessment & Plan  1. Cluster headaches.  The  patient's cluster headaches have increased in frequency, often waking him from sleep. He reports that the headaches are similar to previous episodes, with tearing and pain on the same side. He has not had any brain imaging in the emergency department. His blood pressure is well-regulated today, and he has achieved some weight loss since his last visit. His EKG was normal. An MRI of the brain with and without contrast will be ordered to rule out any underlying issues. He will start verapamil 40 mg three times daily for 2 weeks, with the option to increase the dosage if tolerated. A prednisone taper will be initiated, starting with 60 mg for 7 days, then reducing to 40 mg for 3 days, 20 mg for 3 days, and finally 10 mg for 3 days before discontinuation. He is advised to avoid grapefruit while on verapamil and to continue his losartan regimen. A repeat EKG and electrolyte check will be scheduled in the coming month.    2. Primary hypertension   Blood pressure is well controlled today. Continue losartan 50mg daily.       Follow-up  The patient will follow up in 1 week.    Follow Up:   No follow-ups on file.        Patient or patient representative verbalized consent for the use of Ambient Listening during the visit with  Ivette Shah MD for chart documentation. 3/3/2025  08:28 EST    Ivette Shah  [facility] [place]

## 2025-03-03 NOTE — CONSULTS
[facility]          Initial 60 Minute Nutrition Visit    Date: 2025   Patient Name: Milton Buenrostro  : 1985   MRN: [id number]   Referring Provider: Ivette Shah MD    Reason for Visit: HTN, Wt Loss  Visit Format: IP    Nutrition Assessment       Social History:   Social History     Socioeconomic History    Marital status: Single   Tobacco Use    Smoking status: Never    Smokeless tobacco: Never   Vaping Use    Vaping status: Never Used   Substance and Sexual Activity    Alcohol use: Not Currently     Alcohol/week: 2.0 standard drinks of alcohol     Types: 1 Glasses of wine, 1 Cans of beer per week    Drug use: Never    Sexual activity: Yes     Partners: Female     Active Problem List:   Patient Active Problem List    Diagnosis     Obesity (BMI 30-39.9) [E66.9]     Primary hypertension [I10]       Current Medications:   Current Outpatient Medications:     losartan (Cozaar) 50 MG tablet, Take 1 tablet by mouth Daily., Disp: 30 tablet, Rfl: 3    predniSONE (DELTASONE) 20 MG tablet, 3 tabs PO daily x7 days, 2 tabs PO daily x3 days, 1 tab PO daily x3 days, 0.5 tab PO. daily x3 days, then STOP, Disp: 32 tablet, Rfl: 0    SUMAtriptan (IMITREX) 20 MG/ACT nasal spray, Administer 1 spray into the nostril(s) as directed by provider Every 2 (Two) Hours As Needed for Migraine (max 1 dose (20mg) per 24 hours.)., Disp: 30 each, Rfl: 0    verapamil (CALAN) 40 MG tablet, Take 1 tablet by mouth 3 (Three) Times a Day for 30 days., Disp: 90 tablet, Rfl: 0    Labs: n/a    Hunger Vital Sign Food Insecurity Assessment:  Within the past 12 months I/we worried whether our food would run out before I/we got money to buy more: no   Within the past 12 months the food I/we bought just didn't last and I/we didn't have money to get more: no   Use of food assistance programs (WIC, food stamps, food pettit) no       Food & Nutrition Related History       Food Allergies: n/a  Food  "Intolerances: n/a  Food Behavior: n/a  Nutrition Impact Symptoms:  none  Gastrointestinal conditions that impact intake or food choices: n/a  Details at home: n/a   Who prepares most meals: patient   Who does grocery shopping: patient   How many meals are purchased from fast food/sit down restaurants per week: 2  Difficulty chewin - Normal  Difficulty swallowin - Normal  Diet requirement related to personal preference or cultural belief: in general, a \"healthy\" diet  , Mediterranean or DASH diet  History of eating disorder/disordered eating habits: None  Language/communication details: English  Barriers to learning: No barriers identified at this time    24 Hour Recall:   Time Food/beverages consumed   B Protein shake    S Almonds and fruit     Protein shake after the gym   D Chicken, rice, broccoli                      Additional comments: Dinner is consistent but other meals are not consistent. Does not eat a meal throughout the day, will snack    Anthropometrics      Height:   Ht Readings from Last 1 Encounters:   25 188 cm (74\")     Weight:   Wt Readings from Last 3 Encounters:   25 100 kg (221 lb 6.4 oz)   25 100 kg (221 lb)   25 101 kg (222 lb)     BMI: There is no height or weight on file to calculate BMI.   Weight Change: n/a     Physical Activity       Barriers to physical activity: no barriers identified      Physical activity comments: He does workout 4-5 days per week, includes cardio and strength training     Estimated Needs     Estimated Energy Needs: not discussed at this time     Estimated Protein Needs: >100g/day      Estimated Fluid Needs: >64oz/day      Discussion / Education      Patient is a 39 year old male referred for hypertension and weight loss. Patient states that he is wanting to lose a few pounds and gain muscle mass. He states that he is wanting to lower his blood pressure as well. He states that he has recently made some changes to his eating habits to " help with his goals. He states that he has already lost around 15 pounds in the last month by changing some of his eating habits. He states that he does exercise around 4-5 times per week, doing cardio and weight training. He states that he does struggle with his eating habits during the day due to his work schedule. He states that he tries to eat breakfast but doesn't very often and he does not eat lunch. He will eat dinner after his workout around 7:30-8pm regularly. He states that he used to drink a lot of juice during the day which would lead to excess calorie consumption. Has recently tried to lower the consumption of juice and drink more water.     Education provided today primarily focused on weight management and hypertension. Educated patient on the three macronutrients and what each do for our body. Discussed the difference between saturated and unsaturated fat. Discussed how to build a healthy plate by using the plate method. Explained the importance of portion control and balanced meals. Discussed the importance of fiber for GI health, satiety, and lowering cholesterol levels. Discussed healthy snack options and quick meal ideas. Provided patient with example snack and easy meal ideas for midday meals/snacks. Encouraged patient to eat consistent meals throughout the day. Discussed the DASH diet and recommended lowering sodium, saturated fat, and added sugar consumption. We discussed timing of meals to optimize performance and energy levels for workouts. Encouraged continuing physical activity to promote optimal health. Encouraged patient to reach out with any additional questions or concerns.           Assessment of patient engagement: Engaged    Measurement of understanding: Patient verbalized understanding    Resources Provided: 3 Macronutrients, weight management packet, DASH diet, high fiber snacks,  Tips to support weight loss (AND), quick meal ideas, tips to low sodium intake handout     Goal (s)       Goal 1: Eat more consistent meals each day      Goal 2: Pack snacks to have on hand during work days      Goal 3: Include a pre workout meal/snack     Plan of Care     PES Statement:   Overweight / Obesity related to diet and lifestye as evidenced by BMI.     Follow Up Visit      Follow Up:   April 7th @ 9:00am    Total of 60 minutes spent with patient on nutrition counseling. Education based on Academy of Nutrition and Dietetics guidelines. Patient was provided with RD's contact information. Thank you for this referral.

## 2025-04-21 PROCEDURE — 87205 SMEAR GRAM STAIN: CPT | Performed by: FAMILY MEDICINE

## 2025-04-21 PROCEDURE — 87186 SC STD MICRODIL/AGAR DIL: CPT | Performed by: FAMILY MEDICINE

## 2025-04-21 PROCEDURE — 87070 CULTURE OTHR SPECIMN AEROBIC: CPT | Performed by: FAMILY MEDICINE

## 2025-04-21 PROCEDURE — 87077 CULTURE AEROBIC IDENTIFY: CPT | Performed by: FAMILY MEDICINE

## 2025-04-22 ENCOUNTER — PATIENT ROUNDING (BHMG ONLY) (OUTPATIENT)
Dept: URGENT CARE | Facility: CLINIC | Age: 40
End: 2025-04-22
Payer: COMMERCIAL

## 2025-04-24 ENCOUNTER — PATIENT ROUNDING (BHMG ONLY) (OUTPATIENT)
Dept: URGENT CARE | Facility: CLINIC | Age: 40
End: 2025-04-24
Payer: COMMERCIAL

## 2025-05-31 ENCOUNTER — PATIENT ROUNDING (BHMG ONLY) (OUTPATIENT)
Dept: URGENT CARE | Facility: CLINIC | Age: 40
End: 2025-05-31
Payer: COMMERCIAL